# Patient Record
Sex: MALE | Race: BLACK OR AFRICAN AMERICAN | NOT HISPANIC OR LATINO | Employment: UNEMPLOYED | URBAN - METROPOLITAN AREA
[De-identification: names, ages, dates, MRNs, and addresses within clinical notes are randomized per-mention and may not be internally consistent; named-entity substitution may affect disease eponyms.]

---

## 2020-06-10 ENCOUNTER — NURSE TRIAGE (OUTPATIENT)
Dept: NURSING | Facility: CLINIC | Age: 25
End: 2020-06-10
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
## 2021-11-28 ENCOUNTER — HEALTH MAINTENANCE LETTER (OUTPATIENT)
Age: 26
End: 2021-11-28

## 2022-09-10 ENCOUNTER — HEALTH MAINTENANCE LETTER (OUTPATIENT)
Age: 27
End: 2022-09-10

## 2023-01-22 ENCOUNTER — HEALTH MAINTENANCE LETTER (OUTPATIENT)
Age: 28
End: 2023-01-22

## 2023-07-11 ENCOUNTER — OFFICE VISIT (OUTPATIENT)
Dept: FAMILY MEDICINE | Facility: CLINIC | Age: 28
End: 2023-07-11
Payer: COMMERCIAL

## 2023-07-11 VITALS
RESPIRATION RATE: 17 BRPM | OXYGEN SATURATION: 98 % | HEIGHT: 69 IN | SYSTOLIC BLOOD PRESSURE: 120 MMHG | HEART RATE: 74 BPM | WEIGHT: 171.3 LBS | TEMPERATURE: 98.7 F | BODY MASS INDEX: 25.37 KG/M2 | DIASTOLIC BLOOD PRESSURE: 76 MMHG

## 2023-07-11 DIAGNOSIS — F32.1 CURRENT MODERATE EPISODE OF MAJOR DEPRESSIVE DISORDER WITHOUT PRIOR EPISODE (H): ICD-10-CM

## 2023-07-11 DIAGNOSIS — F19.90 SUBSTANCE USE DISORDER: Primary | ICD-10-CM

## 2023-07-11 ASSESSMENT — PATIENT HEALTH QUESTIONNAIRE - PHQ9
SUM OF ALL RESPONSES TO PHQ QUESTIONS 1-9: 11
5. POOR APPETITE OR OVEREATING: SEVERAL DAYS

## 2023-07-11 ASSESSMENT — ANXIETY QUESTIONNAIRES
6. BECOMING EASILY ANNOYED OR IRRITABLE: SEVERAL DAYS
7. FEELING AFRAID AS IF SOMETHING AWFUL MIGHT HAPPEN: NOT AT ALL
5. BEING SO RESTLESS THAT IT IS HARD TO SIT STILL: NOT AT ALL
3. WORRYING TOO MUCH ABOUT DIFFERENT THINGS: MORE THAN HALF THE DAYS
1. FEELING NERVOUS, ANXIOUS, OR ON EDGE: SEVERAL DAYS
2. NOT BEING ABLE TO STOP OR CONTROL WORRYING: MORE THAN HALF THE DAYS
GAD7 TOTAL SCORE: 7
GAD7 TOTAL SCORE: 7

## 2023-07-11 ASSESSMENT — PAIN SCALES - GENERAL: PAINLEVEL: NO PAIN (0)

## 2023-07-11 NOTE — NURSING NOTE
"ROOM:3  [facility]    Preferred Name: Kehinde     How did you hear about us?  Internet / Google Search    28 year old  Chief Complaint   Patient presents with     Forms     Professional statement of need paperwork       Blood pressure 120/76, pulse 74, temperature 98.7  F (37.1  C), temperature source Oral, resp. rate 17, height 1.748 m (5' 8.8\"), weight 77.7 kg (171 lb 4.8 oz), SpO2 98 %. Body mass index is 25.44 kg/m .  BP completed using cuff size:        There is no problem list on file for this patient.      Wt Readings from Last 2 Encounters:   07/11/23 77.7 kg (171 lb 4.8 oz)     BP Readings from Last 3 Encounters:   07/11/23 120/76       Not on File    No current outpatient medications on file.     No current facility-administered medications for this visit.       Social History     Tobacco Use     Smoking status: Every Day     Types: Cigarettes     Smokeless tobacco: Never   Vaping Use     Vaping Use: Every day   Substance Use Topics     Alcohol use: Yes       Honoring Choices - Health Care Directive Guide offered to patient at time of visit.    Health Maintenance Due   Topic Date Due     ADVANCE CARE PLANNING  Never done     COVID-19 Vaccine (1) Never done     HIV SCREENING  Never done     HEPATITIS C SCREENING  Never done         There is no immunization history on file for this patient.    No results found for: PAP    No lab results found.         No data to display                    7/11/2023    10:26 AM   PHQ-9 SCORE   PHQ-9 Total Score 11           7/11/2023    10:26 AM   JEREMY-7 SCORE   Total Score 7            No data to display                Chetna Brock, EMT    July 11, 2023 10:29 AM    "

## 2023-07-11 NOTE — PROGRESS NOTES
Depression Response    Patient completed the PHQ-9 assessment for depression and scored >9? Yes  Question 9 on the PHQ-9 was positive for suicidality? No  Does patient have current mental health provider? No    Is this a virtual visit? No    I personally notified the following: visit provider    Kehinde RALEIGH Barksdale is a 28 year old male who presents today to discuss and follow up on paperwork needed for housing.  His needs are around mental health and substance use disorder.  He is still actively using opioids, he is trying very hard to change his circumstances and surroundings, putting him in a better place to stop using drugs    Review Of Systems   ROS: 10 point ROS neg other than the symptoms noted above in the HPI.      Past Medical History:   Diagnosis Date     Anxiety      History reviewed. No pertinent surgical history.  Social History     Socioeconomic History     Marital status: Single     Spouse name: Not on file     Number of children: Not on file     Years of education: Not on file     Highest education level: Not on file   Occupational History     Not on file   Tobacco Use     Smoking status: Every Day     Packs/day: 0.50     Types: Cigarettes     Smokeless tobacco: Never   Vaping Use     Vaping Use: Every day     Substances: Nicotine   Substance and Sexual Activity     Alcohol use: Yes     Comment: once/month     Drug use: Yes     Types: Opiates     Sexual activity: Yes     Partners: Female     Birth control/protection: Condom   Other Topics Concern     Not on file   Social History Narrative     Not on file     Social Determinants of Health     Financial Resource Strain: Not on file   Food Insecurity: Not on file   Transportation Needs: Not on file   Physical Activity: Not on file   Stress: Not on file   Social Connections: Not on file   Intimate Partner Violence: Not on file   Housing Stability: Not on file     History reviewed. No pertinent family history.    /76 (BP Location: Left arm, Patient  "Position: Sitting, Cuff Size: Adult Regular)   Pulse 74   Temp 98.7  F (37.1  C) (Oral)   Resp 17   Ht 1.748 m (5' 8.8\")   Wt 77.7 kg (171 lb 4.8 oz)   SpO2 98%   BMI 25.44 kg/m      Exam:  Constitutional: healthy, alert and mild distress  Psychiatric: mentation appears normal and affect normal/bright    Assessment/Plan:  (F19.90) Substance use disorder  (primary encounter diagnosis)    Plan: Paperwork completed    (F32.1) Current moderate episode of major depressive disorder without prior episode (H)    Plan: Paperwork completed    Return to clinic prn        Options for treatment and follow-up care were reviewed with the patient. Patient engaged in the decision making process and verbalized understanding of the options discussed and agreed with the final plan.           "

## 2023-10-01 ENCOUNTER — HEALTH MAINTENANCE LETTER (OUTPATIENT)
Age: 28
End: 2023-10-01

## 2023-10-25 ENCOUNTER — OFFICE VISIT (OUTPATIENT)
Dept: BEHAVIORAL HEALTH | Facility: CLINIC | Age: 28
End: 2023-10-25
Payer: COMMERCIAL

## 2023-10-25 DIAGNOSIS — F11.20 OPIOID USE DISORDER, SEVERE, DEPENDENCE (H): Primary | ICD-10-CM

## 2023-10-25 LAB
AMPHETAMINE QUAL URINE POCT: NEGATIVE
BARBITURATE QUAL URINE POCT: NEGATIVE
BENZODIAZEPINE QUAL URINE POCT: NEGATIVE
BUPRENORPHINE QUAL URINE POCT: NEGATIVE
COCAINE QUAL URINE POCT: NEGATIVE
CREATININE QUAL URINE POCT: NORMAL
INTERNAL QC QUAL URINE POCT: NORMAL
MDMA QUAL URINE POCT: NEGATIVE
METHADONE QUAL URINE POCT: NEGATIVE
METHAMPHETAMINE QUAL URINE POCT: NEGATIVE
OPIATE QUAL URINE POCT: NEGATIVE
OXYCODONE QUAL URINE POCT: NEGATIVE
PH QUAL URINE POCT: NORMAL
PHENCYCLIDINE QUAL URINE POCT: NEGATIVE
POCT KIT EXPIRATION DATE: NORMAL
POCT KIT LOT NUMBER: NORMAL
SPECIFIC GRAVITY POCT: 1.02
TEMPERATURE URINE POCT: NORMAL
THC QUAL URINE POCT: NEGATIVE

## 2023-10-25 PROCEDURE — H0038 SELF-HELP/PEER SVC PER 15MIN: HCPCS

## 2023-10-25 PROCEDURE — 80305 DRUG TEST PRSMV DIR OPT OBS: CPT | Performed by: FAMILY MEDICINE

## 2023-10-25 PROCEDURE — 99204 OFFICE O/P NEW MOD 45 MIN: CPT | Mod: GC | Performed by: FAMILY MEDICINE

## 2023-10-25 RX ORDER — NICOTINE 21 MG/24HR
1 PATCH, TRANSDERMAL 24 HOURS TRANSDERMAL EVERY 24 HOURS
Qty: 30 PATCH | Refills: 3 | Status: SHIPPED | OUTPATIENT
Start: 2023-10-25

## 2023-10-25 RX ORDER — CLONIDINE HYDROCHLORIDE 0.1 MG/1
0.1 TABLET ORAL 3 TIMES DAILY PRN
Qty: 15 TABLET | Refills: 0 | Status: SHIPPED | OUTPATIENT
Start: 2023-10-25

## 2023-10-25 RX ORDER — TRAZODONE HYDROCHLORIDE 50 MG/1
50 TABLET, FILM COATED ORAL AT BEDTIME
Qty: 7 TABLET | Refills: 0 | Status: SHIPPED | OUTPATIENT
Start: 2023-10-25

## 2023-10-25 RX ORDER — LOPERAMIDE HYDROCHLORIDE 2 MG/1
2 TABLET ORAL 3 TIMES DAILY PRN
Qty: 15 TABLET | Refills: 0 | Status: SHIPPED | OUTPATIENT
Start: 2023-10-25

## 2023-10-25 RX ORDER — HYDROXYZINE HYDROCHLORIDE 25 MG/1
25-50 TABLET, FILM COATED ORAL 3 TIMES DAILY PRN
Qty: 20 TABLET | Refills: 0 | Status: SHIPPED | OUTPATIENT
Start: 2023-10-25

## 2023-10-25 RX ORDER — ONDANSETRON 4 MG/1
4 TABLET, ORALLY DISINTEGRATING ORAL EVERY 8 HOURS PRN
Qty: 15 TABLET | Refills: 0 | Status: SHIPPED | OUTPATIENT
Start: 2023-10-25

## 2023-10-25 RX ORDER — BUPRENORPHINE AND NALOXONE 8; 2 MG/1; MG/1
1 FILM, SOLUBLE BUCCAL; SUBLINGUAL 2 TIMES DAILY
Qty: 18 FILM | Refills: 0 | Status: SHIPPED | OUTPATIENT
Start: 2023-10-25 | End: 2023-11-03

## 2023-10-25 RX ORDER — GABAPENTIN 300 MG/1
300-600 CAPSULE ORAL 3 TIMES DAILY PRN
Qty: 30 CAPSULE | Refills: 0 | Status: SHIPPED | OUTPATIENT
Start: 2023-10-25

## 2023-10-25 ASSESSMENT — PATIENT HEALTH QUESTIONNAIRE - PHQ9: SUM OF ALL RESPONSES TO PHQ QUESTIONS 1-9: 7

## 2023-10-25 NOTE — PROGRESS NOTES
[facility] Initial Visit    ASSESSMENT/PLAN                                                      Opioid use disorder, severe  Previously trialed on Suboxone multiple times, is relatively adept at managing physical withdrawal symptoms at home but always returns to use within a few days after withdrawal symptoms have subsided due to cravings. Last use 24 hours ago, COWS 5. Previously has used Suboxone for detox only, not for long-term management. Discussed these medications as a long-term treatment, as he focuses on changing the external circumstances leading to substance use - he will likely need this encouraged at each visit.   - Suboxone, home initiation to target TDD 16mg, comfort meds provided  - Infectious labs at next visit   - Hopes to discuss treatment (outpatient preferred, but open to alternative) at next visit   - Follow up in 1 week    Tobacco use disorder, unspecified  Hopes to completely quit.   - NRT provided, discuss varenicline at next visit     History of generalized anxiety disorder   Currently not on medications, mood is stable. Withdrawal symptoms do manifest with significant physical anxiety symptoms (chest pain, palpitations). Previous UDS at [facility] did show clozapine on mass spec, this was never prescribed to him.      Return in about 1 week (around 11/1/2023).    Patient counseling completed today:  Discussed mechanism of action, potential risks/benefits/side effects of medications and other recommendations above.    Discussed risk of precipitated withdrawal with initiation of buprenorphine in the presence of full opioid agonists.    Reviewed directions for initiation of buprenorphine to reduce risk of precipitated withdrawal and maximize efficacy.    Harm reduction counseling including never use alone, availability of naloxone, avoiding combination of opioids with benzodiazepines, alcohol, or other sedatives, safer administration.      Discussed importance of avoiding  isolation, building a network of supportive relationships, avoiding people/places/things associated with past use to reduce risk of relapse; including motivational interviewing regarding psychosocial treatment for addiction.     SUBJECTIVE                                                      CC/HPI:  Kehinde Barksdale is a 28 year old male with PMH generalized anxiety disorder, and opioid use disorder who presents to the Recovery Clinic for initial visit.      Brief History:  Kehinde Barksdale was first seen in Recovery Clinic on 10/25/23. They were referred by self.  Patient's reasons for seeking treatment on this date include desire for complete abstinence from fentanyl.     Substance Use History :  Opioids:   Age at first use: 27  Current use: substance: fentanyl; quantity 1/4 to 1/2 gram daily; route: inhalation ; timing of last use: 1 days ago;      IV drug use: No   History of overdose: No  Previous residential or outpatient treatments for addiction : No  Previous medication treatments for addiction: Yes: previously on Suboxone up to 16mg, last a few months ago  Longest period of sobriety: 1 month, 2 years ago  Medical complications related to substance use: Overdose x1   Hepatitis C: negative; Date of most recent testin  HIV: negative; Date of most recent testin    Taking buprenorphine? No   Narcan currently available: Yes    DSM-5 OUD criteria met:  Taken in larger amounts/greater time spent in behavior over longer period of time than intended,Yes:    Persistent desire or unsuccessful efforts to cut down or control use/behavior, Yes:    A great deal of time is spent in activities necessary to obtain the substance/participate in the behavior or recover from its effects, Yes:    Cravings, Yes:    Recurrent use/behavior resulting in failure to fulfill major role obligations at work, school, or home, Yes:    Continued use/behavior despite having persistent or recurrent social or interpersonal  problems caused or exacerbated by effects of use/behavior, Yes:    Important social, occupational, or recreational activities are given up or reduced because of use/behavior, Yes:    Recurrent use/behavior in situations in which it is physically hazardous, Yes:    Continued use/behavior despite knowledge of having a persistent or recurrent physical or psychological problem that is likely to have been caused or exacerbated by use/behavior, Yes:    Tolerance, YES    Withdrawal, Yes:      Other Addiction History:  Stimulants (cocaine, methamphetamine, MDMA/ecstasy)   Denies  Sedatives/hypnotics/anxiolytics: (benzodiazepines, GHB, Ambien, phenobarbital)  Denies  Alcohol:   Denies  Nicotine: (cigarettes, vaping, chew/snuff)  Yes, 1/2 ppd.   Cannabis:   Former significant use, cutting back.   Hallucinogens/Dissociatives: (acid, mushrooms, ketamine)  Denies  Eating disorder:  Denies  Gambling:   Denies     [place] Prescription Drug Monitoring Program Reviewed:  Yes; buprenorphine 16mg TDD last prescribed May 2023 with 2 day script     PAST PSYCHIATRIC HISTORY:  Diagnoses- Anxiety    Suicide Attempts: No   Hospitalizations: No         7/11/2023    10:26 AM 10/25/2023     2:00 PM   PHQ   PHQ-9 Total Score 11 7   Q9: Thoughts of better off dead/self-harm past 2 weeks Not at all Not at all     If PHQ-9 score of 15 or higher, has Recovery Clinic therapist or provider been notified? N/A    Any current suicidal ideation? No  If yes, has Recovery Clinic therapist or provider been notified? No    Mental health provider: None currently (follow up on MH referral if needed)    Social History  Housing status:  [place] [place] Nobody else at home.   Employment status: Unemployed, seeking work  Relationship status: Single  Children: 3 children (11, 9, 1)   Legal: None  Insurance needs: Robert   Contact information up to date? Yes    3rd Party Involvement NA (please obtain YOGI if pt would like to  include)    Medications:  No current outpatient medications on file prior to visit.  No current facility-administered medications on file prior to visit.    No Known Allergies    Past Medical History:   Diagnosis Date    Anxiety      Family history:  Brother with fentanyl use.   No known family history of psychiatric disease    OBJECTIVE                                                      Clinical Opioid Withdrawal Scale (COWS)    Resting Pulse Rate  1  =     Sweating    (over past 1/2 hour) 2  =  flushed or observable moistness on face   Restlessness  0  =  able to sit still   Pupil size  1  =  pupils possibly larger than normal for room light   Bone or Joint Aches    (acute only) 0  =  not present   Runny nose or tearing    (unrelated to cold/allergies) 0  =  not present   GI Upset    (over past 1/2 hour) 0  =  no GI symptoms   Tremor    (outstretched hands) 0  =  no tremor   Yawning    (during assessment) 0  =  no yawning   Anxiety/Irritability 1  =  patient reports increasing irritability or anxiousness   Gooseflesh skin 0  =  skin is smooth     TOTAL SCORE  Add column for score   5      There were no vitals taken for this visit.    Physical Exam  General: Comfortable appearing.    HEENT: Pupils equal round and reactive, slightly largein size. Mucous membranes moist No gross trauma  Cardiac: Grossly well perfused.   Pulmonary: Breathing comfortably on room air.   Abdominal exam: No scars, no significant distension or tenderness.   Extremities: No gross edema.   Skin: Warm and dry.  No visible scars or signs of infection.   Neurologic: No focal neurologic deficits.    Psychiatric: Alert and oriented.  MSE: Linear thought, normal affect and speech     Labs:    UDS:   [unfilled]  *POC urine drug screen does not screen for Fentanyl    Recent Results (from the past 720 hour(s))   Drugs of Abuse Screen Urine (POC CUPS) POCT    Collection Time: 10/25/23  2:56 PM   Result Value Ref Range    POCT Kit Lot Number  [id number]     POCT Kit Expiration Date 0563001     Temperature Urine POCT 90 F 90 F, 92 F, 94 F, 96 F, 98 F, 100 F    Specific Tony POCT 1.025 1.005, 1.015, 1.025    pH Qual Urine POCT 5 pH 4 pH, 5 pH, 7 pH, 9 pH    Creatinine Qual Urine POCT 50 mg/dL 20 mg/dL, 50 mg/dL, 100 mg/dL, 200 mg/dL    Internal QC Qual Urine POCT Valid Valid    Amphetamine Qual Urine POCT Negative Negative    Barbiturate Qual Urine POCT Negative Negative    Buprenorphine Qual Urine POCT Negative Negative    Benzodiazepine Qual Urine POCT Negative Negative    Cocaine Qual Urine POCT Negative Negative    Methamphetamine Qual Urine POCT Negative Negative    MDMA Qual Urine POCT Negative Negative    Methadone Qual Urine POCT Negative Negative    Opiate Qual Urine POCT Negative Negative    Oxycodone Qual Urine POCT Negative Negative    Phencyclidine Qual Urine POCT Negative Negative    THC Qual Urine POCT Negative Negative     At least 45 min spent in review of medical record,  review, obtaining histories, discussing recommendations, counseling, [place] [address]  [phone]    Willie Lozano MD  Addiction Medicine Fellow  [facility]       I have reviewed this note but have not examined the patient.  I agree with the plan as documented.  Eliane Irby [place] [address]  [phone]

## 2023-10-25 NOTE — PROGRESS NOTES
[facility]    Peer  met with Kehinde Barksdale in the Recovery Clinic to introduce herself, detail services provided and discuss current status of recovery. Pt appeared alert, oriented and open to feedback during our discussion.     Pt arrives for visit with provider for suboxone.  PRC sees patient today under provider diagnosis of opioid substance disorder, severe, dependence  (H)         Kehinde came to the clinic seeking help for withdrawal symptoms and addiction. He shared that he has easy access to drugs in [facility] and often travels to the [facility] to obtain them. Dilshad mentioned that he resides in [place].      During the session, Dilshad appeared to be sweating profusely, indicating physical discomfort due to withdrawal symptoms.    The peer  engaged Kehinde in a conversation about his willingness to continue discussing his situation. Kehinde expressed his desire to proceed with the conversation.      The peer  assured Dilshad that the doctor and nurse at the clinic would provide him with the necessary assistance and support.    The peer  asked Dilshad about his thoughts on going to treatment. However, Dilshad declined the suggestion and instead expressed his intention to try suboxone, a medication commonly used for opioid addiction treatment.        [facility] provided business card to pt welcoming contact for recovery based support and resources. PRC and pt agree to speak again during an upcoming  visit.           Service Type:     Individual     Session Start Time:     2:00 pm                  Session End Time:    2:15 pm    Session Length:       15 mins       Patient Goal:   To utilize suboxone assisted treatment for sobriety and long term recovery.     Goal Progress:   Ongoing.    Key Risk Factors to Recovery:   [facility] encourages being aware of risk factors that can lead to re-use which  include avoiding isolation, avoiding triggers and managing cravings in a healthy manner. being open and willing to acceptance and change on a daily basis.  PRC encourages pt to establish a sober network calling tree to reach out to when needed.  Continue to practice honesty with ourselves and trusted support person(s).   PRC encourages regular attendance at recovery based meetings as well as finding a sponsor for mentoring and accountability.   PRC encourages consideration of other services such as counseling for mental health issues which can correlate with our substance use.      Support Needs:   Ongoing care, support and resources for opioid substance use disorder.     Follow up:   PRC has provided pt with her contact information for support and resource needs.    PRC and pt agree to meet during an upcoming  visit.       [facility]  [address], [address]   [place], [place], [address]  Clinic Phone: [phone]  Clinic Fax: [phone]  Peer  phone: [phone]    Open Monday - Friday  9:00am-4:00pm  Walk in hours: 9am-3pm      Peace Warner  October 25, 2023  2:52 PM    GHAZAL Yoon provides clinical oversite and supervision of care.

## 2023-10-25 NOTE — PATIENT INSTRUCTIONS
Buprenorphine Self Start:    1) Take a day off and have a place to rest.  2) Stop using, and wait until you feel very sick from withdrawals, at least 24 hours. Use the as-needed medications provided to get this far.   3) Dose one or two 8mg films or tablets under your tongue (first dose 8-16mg)  4) Take another one or two 8mg films or tablets an hour later to feel well (can take these immediately after first dose if your symptoms get worse)  5) The next day, take 2 films or tablets (16) at once.    6) The next day, continue taking 2 films or tablets every day to control withdrawal symptoms and cravings.

## 2024-11-24 ENCOUNTER — HEALTH MAINTENANCE LETTER (OUTPATIENT)
Age: 29
End: 2024-11-24

## 2024-12-01 ENCOUNTER — APPOINTMENT (OUTPATIENT)
Dept: GENERAL RADIOLOGY | Facility: CLINIC | Age: 29
End: 2024-12-01
Attending: EMERGENCY MEDICINE

## 2024-12-01 ENCOUNTER — HOSPITAL ENCOUNTER (EMERGENCY)
Facility: CLINIC | Age: 29
Discharge: HOME | End: 2024-12-01
Attending: EMERGENCY MEDICINE | Admitting: EMERGENCY MEDICINE

## 2024-12-01 VITALS
OXYGEN SATURATION: 99 % | BODY MASS INDEX: 25.18 KG/M2 | SYSTOLIC BLOOD PRESSURE: 96 MMHG | RESPIRATION RATE: 18 BRPM | DIASTOLIC BLOOD PRESSURE: 57 MMHG | TEMPERATURE: 97.5 F | WEIGHT: 170 LBS | HEIGHT: 69 IN | HEART RATE: 78 BPM

## 2024-12-01 DIAGNOSIS — J18.9 PNEUMONIA OF RIGHT LUNG DUE TO INFECTIOUS ORGANISM, UNSPECIFIED PART OF LUNG: ICD-10-CM

## 2024-12-01 LAB
FLUAV RNA SPEC QL NAA+PROBE: NEGATIVE
FLUBV RNA RESP QL NAA+PROBE: NEGATIVE
RSV RNA SPEC NAA+PROBE: NEGATIVE
SARS-COV-2 RNA RESP QL NAA+PROBE: NEGATIVE

## 2024-12-01 PROCEDURE — 87637 SARSCOV2&INF A&B&RSV AMP PRB: CPT | Performed by: EMERGENCY MEDICINE

## 2024-12-01 PROCEDURE — 99284 EMERGENCY DEPT VISIT MOD MDM: CPT | Performed by: EMERGENCY MEDICINE

## 2024-12-01 PROCEDURE — 99284 EMERGENCY DEPT VISIT MOD MDM: CPT | Mod: 25 | Performed by: EMERGENCY MEDICINE

## 2024-12-01 PROCEDURE — 250N000013 HC RX MED GY IP 250 OP 250 PS 637: Performed by: EMERGENCY MEDICINE

## 2024-12-01 PROCEDURE — 71046 X-RAY EXAM CHEST 2 VIEWS: CPT

## 2024-12-01 RX ORDER — AMOXICILLIN 250 MG/1
1000 CAPSULE ORAL ONCE
Status: COMPLETED | OUTPATIENT
Start: 2024-12-01 | End: 2024-12-01

## 2024-12-01 RX ORDER — AMOXICILLIN 500 MG/1
1000 CAPSULE ORAL 3 TIMES DAILY
Qty: 42 CAPSULE | Refills: 0 | Status: SHIPPED | OUTPATIENT
Start: 2024-12-01 | End: 2024-12-08

## 2024-12-01 RX ADMIN — AMOXICILLIN 1000 MG: 250 CAPSULE ORAL at 22:01

## 2024-12-01 ASSESSMENT — ACTIVITIES OF DAILY LIVING (ADL)
ADLS_ACUITY_SCORE: 41
ADLS_ACUITY_SCORE: 41

## 2024-12-01 ASSESSMENT — COLUMBIA-SUICIDE SEVERITY RATING SCALE - C-SSRS
6. HAVE YOU EVER DONE ANYTHING, STARTED TO DO ANYTHING, OR PREPARED TO DO ANYTHING TO END YOUR LIFE?: NO
2. HAVE YOU ACTUALLY HAD ANY THOUGHTS OF KILLING YOURSELF IN THE PAST MONTH?: NO
1. IN THE PAST MONTH, HAVE YOU WISHED YOU WERE DEAD OR WISHED YOU COULD GO TO SLEEP AND NOT WAKE UP?: NO

## 2024-12-02 NOTE — ED TRIAGE NOTES
Patient reports he believes he has pneumonia. Patient reports having a productive cough and breathing hard. Patient states he can feel it in his chest. Patient reports he has had symptoms since Wednesday. Patient endorses pleurisy.

## 2024-12-02 NOTE — DISCHARGE INSTRUCTIONS
Return to the emergency department if you develop worsening shortness of breath, fevers or if you have any further concerns

## 2024-12-02 NOTE — ED PROVIDER NOTES
"ED Provider Note  [facility]      History     Chief Complaint   Patient presents with    Flu Symptoms     Flu symptoms since Wednesday, patient is concerned he could have pneumonia      HPI  Kehinde Barksdale is a 29 year old male who is presenting with flulike symptoms.  He states having fevers at the end of last week and sweats which have largely resolved but he still has a productive cough and some mild pain with cough and inspiration.  No leg pain or swelling.  No history of blood clot.  No significant exertional worsening.  No vomiting.  Denies abdominal pain.  History of DVT or PE.    Past Medical History  Past Medical History:   Diagnosis Date    Anxiety      No past surgical history on file.  cloNIDine (CATAPRES) 0.1 MG tablet  gabapentin (NEURONTIN) 300 MG capsule  hydrOXYzine (ATARAX) 25 MG tablet  loperamide (IMODIUM A-D) 2 MG tablet  nicotine (NICODERM CQ) 21 MG/24HR 24 hr patch  nicotine (NICORETTE) 4 MG lozenge  ondansetron (ZOFRAN ODT) 4 MG ODT tab  traZODone (DESYREL) 50 MG tablet      No Known Allergies  Family History  No family history on file.  Social History   Social History     Tobacco Use    Smoking status: Every Day     Current packs/day: 0.50     Types: Cigarettes    Smokeless tobacco: Never   Vaping Use    Vaping status: Every Day    Substances: Nicotine   Substance Use Topics    Alcohol use: Yes     Comment: once/month    Drug use: Yes     Types: Opiates      Past medical history, past surgical history, medications, allergies, family history, and social history were reviewed with the patient. No additional pertinent items.   A medically appropriate review of systems was performed with pertinent positives and negatives noted in the HPI, and all other systems negative.    Physical Exam   BP: 110/69  Pulse: 91  Temp: 98.1  F (36.7  C)  Resp: 16  Height: 175.3 cm (5' 9\")  Weight: 77.1 kg (170 lb)  SpO2: 95 %  Physical Exam  Physical Exam   Constitutional: oriented to " person, place, and time. appears well-developed and well-nourished.   HENT:   Head: Normocephalic and atraumatic.   Neck: Normal range of motion.   Pulmonary/Chest: Effort normal. No respiratory distress. Clear lungs bilaterally  Cardiac: No murmurs, rubs, gallops. RRR.  Abdominal: Abdomen soft, nontender, nondistended. No rebound tenderness.  MSK: Long bones without deformity or evidence of trauma  Neurological: alert and oriented to person, place, and time.   Skin: Skin is warm and dry.   Psychiatric:  normal mood and affect.  behavior is normal. Thought content normal.       ED Course, Procedures, & Data      Procedures            Results for orders placed or performed during the hospital encounter of 12/01/24   Chest XR,  PA & LAT     Status: None    Narrative    EXAM: XR CHEST 2 VIEWS  LOCATION: [facility]  DATE: 12/1/2024    INDICATION: productive cough, sob  COMPARISON: None.      Impression    IMPRESSION: Large area of consolidation in the right lower lobe posteriorly presumably pneumonia in a patient this age. Left lung clear. No effusions.     Medications - No data to display  Labs Ordered and Resulted from Time of ED Arrival to Time of ED Departure - No data to display  Chest XR,  PA & LAT    (Results Pending)          Critical care was not performed.     Medical Decision Making  The patient's presentation was of moderate complexity (an acute illness with systemic symptoms).    The patient's evaluation involved:  ordering and/or review of 2 test(s) in this encounter (see separate area of note for details)  independent interpretation of testing performed by another health professional (Chest XR with infiltrate)    The patient's management necessitated moderate risk (prescription drug management including medications given in the ED).    Assessment & Plan    Patient resenting with cough, shortness of breath.  X-ray does show pneumonia consistent with his  symptoms.  Vitals are completely normal.  He is on room air.  He is not tachypneic.  He appears quite well.  I did discuss the importance of following with primary care provider due to concern of infiltrate.  Amoxicillin given here is a first dose and he will pick him up tomorrow at his pharmacy.  Discussed return precautions.    I have reviewed the nursing notes. I have reviewed the findings, diagnosis, plan and need for follow up with the patient.    New Prescriptions    AMOXICILLIN (AMOXIL) 500 MG CAPSULE    Take 2 capsules (1,000 mg) by mouth 3 times daily for 7 days.       Final diagnoses:   Pneumonia of right lung due to infectious organism, unspecified part of lung       Nate Boyer  [facility] EMERGENCY DEPARTMENT  12/1/2024     Nate Boyer MD  12/01/24 [id number]